# Patient Record
Sex: MALE | Race: WHITE | Employment: FULL TIME | URBAN - METROPOLITAN AREA
[De-identification: names, ages, dates, MRNs, and addresses within clinical notes are randomized per-mention and may not be internally consistent; named-entity substitution may affect disease eponyms.]

---

## 2019-11-10 ENCOUNTER — OFFICE VISIT (OUTPATIENT)
Dept: URGENT CARE | Facility: URGENT CARE | Age: 24
End: 2019-11-10
Payer: COMMERCIAL

## 2019-11-10 ENCOUNTER — ANCILLARY PROCEDURE (OUTPATIENT)
Dept: GENERAL RADIOLOGY | Facility: CLINIC | Age: 24
End: 2019-11-10
Attending: PHYSICIAN ASSISTANT
Payer: COMMERCIAL

## 2019-11-10 VITALS
DIASTOLIC BLOOD PRESSURE: 70 MMHG | SYSTOLIC BLOOD PRESSURE: 104 MMHG | RESPIRATION RATE: 16 BRPM | BODY MASS INDEX: 25.34 KG/M2 | WEIGHT: 177 LBS | HEART RATE: 72 BPM | TEMPERATURE: 98.4 F | HEIGHT: 70 IN

## 2019-11-10 DIAGNOSIS — M25.511 ACUTE PAIN OF RIGHT SHOULDER: ICD-10-CM

## 2019-11-10 DIAGNOSIS — S49.91XA INJURY OF CLAVICLE, RIGHT, INITIAL ENCOUNTER: Primary | ICD-10-CM

## 2019-11-10 DIAGNOSIS — S49.91XA INJURY OF CLAVICLE, RIGHT, INITIAL ENCOUNTER: ICD-10-CM

## 2019-11-10 PROCEDURE — 99203 OFFICE O/P NEW LOW 30 MIN: CPT | Performed by: PHYSICIAN ASSISTANT

## 2019-11-10 PROCEDURE — 73000 X-RAY EXAM OF COLLAR BONE: CPT | Mod: RT

## 2019-11-10 ASSESSMENT — MIFFLIN-ST. JEOR: SCORE: 1803.09

## 2019-11-11 NOTE — PROGRESS NOTES
"SUBJECTIVE:   Pako Rayo is a 24 year old male who presents to the clinic today complaining of shoulder pain on the right side.    Onset of injury or pain was 1 day(s) ago and was still present. The pain is sharp and superior.  Injury history: fall     Exacerbated by: movement of shoulder   Relieved by: ice  Associated symptoms include right side shoulder and clavicle tenderness.    The patient has tried ice to improve symptoms.  He does not have a history of shoulder pain/injury.    PMH  No pert med hx    Current Outpatient Medications:      order for DME, Equipment being ordered: sling, Disp: 1 Device, Rfl: 0  Social History     Tobacco Use     Smoking status: Never Smoker     Smokeless tobacco: Never Used   Substance Use Topics     Alcohol use: Not on file     ALLERGIES  Cats    FAMILY HX  Allergies    ROS:  GENERAL: negative for acute stress  CONSTITUTIONAL:NEGATIVE for fever, chills, change in weight  INTEGUMENTARY/SKIN: NEGATIVE for worrisome rashes, moles or lesions  ENT/MOUTH: NEGATIVE for ear, mouth and throat problems  RESP:NEGATIVE for significant cough or SOB  CV: NEGATIVE for chest pain, palpitations or peripheral edema  GI: NEGATIVE for nausea, abdominal pain, heartburn, or change in bowel habits  MUSCULOSKELETAL: POSITIVE  for right clavicle tenderness, pain  NEURO: NEGATIVE for weakness, dizziness or paresthesias    EXAM:  /70 (Cuff Size: Adult Regular)   Pulse 72   Temp 98.4  F (36.9  C) (Oral)   Resp 16   Ht 1.784 m (5' 10.25\")   Wt 80.3 kg (177 lb)   BMI 25.22 kg/m    General: no acute distress  Neck: no tenderness of neck  Shoulder Exam: normal exam, no swelling, tenderness, instability; ligaments intact, FROM shoulder joint, AC joint tender to palpation  Vasc: negative for cool extremities  Ext: pulses intact.  Neuro: sensation intact to light touch.     ASSESSMENT: Shoulder strain    PLAN:  Ice recommended  Ibuprofen as needed    ICD-10-CM    1. Injury of clavicle, right, initial " encounter S49.91XA XR Clavicle Right     order for DME   2. Acute pain of right shoulder M25.511 XR Clavicle Right     order for DME       See orders in Epic.

## 2020-02-07 ENCOUNTER — OFFICE VISIT (OUTPATIENT)
Dept: URGENT CARE | Facility: URGENT CARE | Age: 25
End: 2020-02-07
Payer: COMMERCIAL

## 2020-02-07 VITALS
DIASTOLIC BLOOD PRESSURE: 70 MMHG | TEMPERATURE: 98.7 F | HEART RATE: 83 BPM | WEIGHT: 177 LBS | RESPIRATION RATE: 16 BRPM | SYSTOLIC BLOOD PRESSURE: 122 MMHG | OXYGEN SATURATION: 100 % | BODY MASS INDEX: 25.22 KG/M2

## 2020-02-07 DIAGNOSIS — J20.9 ACUTE BRONCHITIS, UNSPECIFIED ORGANISM: Primary | ICD-10-CM

## 2020-02-07 PROCEDURE — 99213 OFFICE O/P EST LOW 20 MIN: CPT | Performed by: INTERNAL MEDICINE

## 2020-02-08 NOTE — PATIENT INSTRUCTIONS
Patient Education     Viral Bronchitis (Adult)    You have a viral bronchitis. Bronchitis is inflammation and swelling of the lining of the lungs. This is often caused by an infection. Symptoms include a dry, hacking cough that is worse at night. The cough may bring up yellow-green mucus. You may also feel short of breath or wheeze. Other symptoms may include tiredness, chest discomfort, and chills.  Bronchitis that is caused by a virus is not treated with antibiotics. Instead, medicines may be given to help relieve symptoms. Symptoms can last up to 2 weeks, although the cough may last much longer.  This illness is contagious during the first few days and is spread through the air by coughing and sneezing, or by direct contact (touching the sick person and then touching your own eyes, nose, or mouth).  Most viral illnesses resolve within 10 to 14 days with rest and simple home remedies, although they may sometimes last for several weeks.  Home care    If symptoms are severe, rest at home for the first 2 to 3 days. When you go back to your usual activities, don't let yourself get too tired.    Do not smoke. Also avoid being exposed to secondhand smoke.    You may use over-the-counter medicine to control fever or pain, unless another pain medicine was prescribed. If you have chronic liver or kidney disease or have ever had a stomach ulcer or gastrointestinal bleeding, talk with your healthcare provider before using these medicines. Also talk to your provider if you are taking medicine to prevent blood clots. Aspirin should never be given to anyone younger than 18 years of age who is ill with a viral infection or fever. It may cause severe liver or brain damage.    Your appetite may be poor, so a light diet is fine. Avoid dehydration by drinking 6 to 8 glasses of fluids per day (such as water, soft drinks, sports drinks, juices, tea, or soup). Extra fluids will help loosen secretions in the nose and  lungs.    Over-the-counter cough, cold, and sore-throat medicines will not shorten the length of the illness, but they may help to reduce symptoms. Don't use decongestants if you have high blood pressure.  Follow-up care  Follow up with your healthcare provider, or as advised. If you had an X-ray or ECG (electrocardiogram), a specialist will review it. You will be notified of any new findings that may affect your care.  If you are age 65 or older, or if you have a chronic lung disease or condition that affects your immune system, or you smoke, ask your healthcare provider about getting a pneumococcal vaccine and a yearly flu shot (influenza vaccine).  When to seek medical advice  Call your healthcare provider right away if any of these occur:    Fever of 100.4 F (38 C) or higher, or as directed by your healthcare provider    Coughing up increased amounts of colored sputum    Weakness, drowsiness, headache, facial pain, ear pain, or a stiff neck  Call 911  Call 911 if any of these occur:    Coughing up blood    Worsening weakness, drowsiness, headache, or stiff neck    Trouble breathing, wheezing, or pain with breathing  Date Last Reviewed: 6/1/2018 2000-2019 [employer]. [address] [place] [address], [place] [address]. All rights reserved. This information is not intended as a substitute for professional medical care. Always follow your healthcare professional's instructions.

## 2020-02-08 NOTE — PROGRESS NOTES
SUBJECTIVE:  Pako Rayo, a 24 year old male, presents for evaluation of cough.  Started 3 days ago.  The cough seems to be getting more deep. Cough is occasionally productive.  Feeling fatigued.  Feels some chest tightness.  Denies fevers. Some headache.  Denies sore throat.  No vocal hoarseness. Has been using Dayquil, Nyquil.      OBJECTIVE:  /70   Pulse 83   Temp 98.7  F (37.1  C) (Oral)   Resp 16   Wt 80.3 kg (177 lb)   SpO2 100%   BMI 25.22 kg/m      GENERAL: healthy, alert and no distress  HENT: ear canals and TM's normal and nose and mouth without ulcers or lesions  NECK: no adenopathy, no asymmetry, masses, or scars and thyroid normal to palpation  RESP: forced expiratory wheeze diffusely and bronchospastic cough without focal rales or rhonchi   CV: regular rates and rhythm, normal S1 S2, no S3 or S4 and no murmur, click or rub -  ABDOMEN: soft, nontender, without hepatosplenomegaly or masses and bowel sounds normal    ASSESSMENT/PLAN:    ICD-10-CM    1. Acute bronchitis, unspecified organism J20.9 Otherwise healthy young adult with a mild viral bronchitis syndrome that should be self-limited.  Supportive care.       Jasiel Everett MD

## 2020-03-15 ENCOUNTER — VIRTUAL VISIT (OUTPATIENT)
Dept: FAMILY MEDICINE | Facility: OTHER | Age: 25
End: 2020-03-15

## 2020-03-15 NOTE — PROGRESS NOTES
"Date: 03/15/2020 13:51:49  Clinician: Nicky Sanchez  Clinician NPI: [id number]  Patient: Pako Rayo  Patient : 1995  Patient Address: [facility], [place] [address]  Patient Phone: [phone]  Visit Protocol: URI  Patient Summary:  Pako is a 24 year old ( : 1995 ) male who initiated a Visit for COVID-19 (Coronavirus) evaluation and screening. When asked the question \"Please sign me up to receive news, health information and promotions from [employer].\", Pako responded \"No\".    Pako states his symptoms started gradually 2-3 weeks ago. He is experiencing mild difficulty breathing with activities but can speak normally in full sentences.   Pako denies having ear pain, malaise, fever, headache, rhinitis, enlarged lymph nodes, facial pain or pressure, myalgias, chills, wheezing, sore throat, cough, nasal congestion, and teeth pain. He also denies having recent facial or sinus surgery in the past 60 days, double sickening (worsening symptoms after initial improvement), and taking antibiotic medication for the symptoms.    Pertinent COVID-19 (Coronavirus) information  Pako has not traveled internationally or to the areas where COVID-19 (Coronavirus) is widespread in the last 14 days before the start of his symptoms.   Pako has not had close contact with a suspected or laboratory-confirmed COVID-19 patient within 14 days of symptom onset.   Pako is not a healthcare worker and does not work in a healthcare facility.   Triage Point(s) temporarily suspended for COVID-19 (Coronavirus) screening  Pako reported the following symptoms which were previously protocol referral points. These protocol referral points have temporarily been removed for purposes of COVID-19 (Coronavirus) screening.   Denied all URI symptoms   Pertinent medical history  Pako needs a return to work/school note.   Weight: 175 lbs   Pako does not smoke or use smokeless tobacco.   Additional information as reported by the " patient (free text): My symptoms have been very mild, with shortness of breath being very mild, and chest discomfort that is mild but over the past couple days has been consistent. Feels like my lungs are working harder than normal. But generally I feel healthy other than that.   Weight: 175 lbs    MEDICATIONS: Mucinex DM oral, Flonase Allergy Relief nasal, ALLERGIES: NKDA  Clinician Response:  Dear Pako,  Based on the information provided, you have a viral upper respiratory infection, otherwise known as a cold. Symptoms vary from person to person, but can include sneezing, coughing, a runny nose, sore throat, and headache and range from mild to severe.  Unfortunately, there are no medications that can cure a cold, so treatment is focused on controlling symptoms as much as possible. Most people gradually feel better until symptoms are gone in 1-2 weeks.  Medication information  Because you have a viral infection, antibiotics will not help you get better. Treating a viral infection with antibiotics could actually make you feel worse.  Unless you are allergic to the over-the-counter medication(s) below, I recommend using:       Acetaminophen (Tylenol or store brand) oral tablet. Take 1-2 tablets by mouth every 4-6 hours to help with the discomfort.      Ibuprofen (Advil or store brand) 200 mg oral tablet. Take 1-3 tablets (200-600 mg) by mouth every 8 hours to help with the discomfort. Make sure to take the ibuprofen with food. Do not exceed 2400 mg in 24 hours.    A decongestant such as Sudafed PE or store brand.      Dextromethorphan (Robitussin DM or store brand). This medication is a cough suppressant that works by decreasing the feeling of needing to cough. Please follow the instructions on the package.     Over-the-counter medications do not require a prescription. Ask the pharmacist if you have any questions.  Self care  The following tips will keep you as comfortable as possible while you recover:     Rest     Drink plenty of water and other liquids     When to seek care  Please be seen in a clinic or urgent care if new symptoms develop, or symptoms become worse.  Additional treatment plan   Dear Pako,  Based on the information you have provided, it does not appear you need Coronavirus (COVID-19) testing.   At this time, we recommend testing primarily for those people who have symptoms of cough and fever and have either traveled to a known area of infection or have been exposed to someone with laboratory confirmed Coronavirus by close contact.   Coronavirus - General Information:   The coronavirus infection starts within 14 days of an exposure.  Symptoms are those of a respiratory infection (such as fever, cough).   If you have not had symptoms by day 15, you should be considered uninfected by coronavirus.   Coronavirus - Symptoms:    The coronavirus can cause a respiratory illness, such as bronchitis or pneumonia.  The most common symptoms are: cough, fever, and shortness of breath.   Other symptoms are: body aches, chills, diarrhea, fatigue, headache, runny nose, and sore throat   Coronavirus - Exposure Risk Factors:   Exposure to a person who has been diagnosed with coronavirus.  Travel from an area with recent local transmission of coronavirus.  The CDC (www.cdc.gov) has the most up-to-date list of where the coronavirus outbreak is occurring.   Coronavirus - Spreading:    The virus likely spreads through respiratory droplets produced when a person coughs or sneezes. These respiratory droplets can travel approximately 6 feet and can remain on surfaces. Common disinfectants will kill the virus.  The CDC currently does not recommend healthy people wear masks.   Coronavirus - Protect Yourself:    Avoid close contact with people known to have this new coronavirus infection.  Wash hands often with soap and water or alcohol-based hand .  Avoid touching the eyes, nose or mouth.   Thank you for limiting contact  with others, wearing a simple mask to cover your cough, practice good hand hygiene habits and accessing our virtual services where possible to limit the spread of this virus.  For more information about COVID19 and options for caring for yourself at home, please visit the CDC website at https://www.cdc.gov/coronavirus/2019-ncov/about/steps-when-sick.html   For more options for care at [facility], please visit our website at [email or web address]     Diagnosis: Cough  Diagnosis ICD: R05

## 2020-07-16 ENCOUNTER — NURSE TRIAGE (OUTPATIENT)
Dept: NURSING | Facility: CLINIC | Age: 25
End: 2020-07-16

## 2020-07-16 DIAGNOSIS — Z11.59 SCREENING FOR VIRAL DISEASE: ICD-10-CM

## 2020-07-16 DIAGNOSIS — Z11.59 SCREENING FOR VIRAL DISEASE: Primary | ICD-10-CM

## 2020-07-16 PROCEDURE — 36415 COLL VENOUS BLD VENIPUNCTURE: CPT | Performed by: EMERGENCY MEDICINE

## 2020-07-16 PROCEDURE — 86769 SARS-COV-2 COVID-19 ANTIBODY: CPT | Mod: 90 | Performed by: EMERGENCY MEDICINE

## 2020-07-16 PROCEDURE — 99000 SPECIMEN HANDLING OFFICE-LAB: CPT | Performed by: EMERGENCY MEDICINE

## 2020-07-16 NOTE — LETTER
July 20, 2020        Pako Rayo  [address] [facility] [address]      COVID-19 Antibody Screen   Date Value Ref Range Status   07/16/2020 Negative  Final     Comment:     No COVID-19 antibodies detected.  Patients within 10 days of symptom onset for   COVID-19 may not produce sufficient levels of detectable antibodies.    Immunocompromised COVID-19 patients may take longer to develop antibodies.       COVID-19 Antibody, IgG Titer   Date Value Ref Range Status   07/16/2020 Not Applicable  Final     Comment:     Qualitative screen for total antibodies to COVID-19 (SARS-CoV-2) with   semi-quantitative measurement of IgG COVID-19 antibodies by endpoint titer.    COVID-19 antibodies may be elevated due to a past or current infection.  Negative results do not rule out COVID-19 infection.  Results from antibody   testing should not be used as the sole basis to diagnose or exclude SARS-CoV-2   infection or to inform infection status.  COVID-19 PCR test should be ordered   if current infection is suspected.  False positive results may occur in rare   cases due to cross-reacting antibodies.  This test was developed and its performance characteristics determined by the   [facility] ([facility]),   which is regulated under CLIA as qualified to perform high-complexity testing.    This test has not been reviewed by the FDA.  Testing performed by [facility], [facility], [address], [address], [place] [address]         No results found for: COVAB      You have tested NEGATIVE for COVID-19 antibodies. This suggests you have not had or been exposed to COVID-19. But it does not mean that for sure.     The test finds antibodies in most people 10 days after they get sick. For some people, it takes longer than 10 days for antibodies to show up. Others may never show antibodies against COVID-19, especially if they  have weak immune systems.    If you have COVID-19 symptoms now, please stay home and away from others.     What is antibody testing?    This is a kind of blood test. We take a small sample of your blood, and then test it for something called  antibodies.      Your body makes antibodies to fight infection. If your blood has antibodies for a certain germ, it means you ve been infected with that germ in the past.     Sometimes, antibodies stay in your body for years after you ve had the infection. They can be there even if the germ didn t make you sick. They are a sign that your body fought off the infection.    Will this test find antibodies in everyone who s had COVID-19?    No. The test finds antibodies in most people 10 days after they get sick. For some people, it takes longer than 10 days for antibodies to show up. Others may never show antibodies against COVID-19, especially if they have weak immune systems.    What does it mean if the test finds COVID-19 antibodies?    If we find these antibodies, it suggests:     This person has had the virus.     Their body s immune system fought the virus.     We don t know if this will help protect someone from getting COVID-19 again. Scientists are still learning about this.    What are the signs of COVID-19?    Signs of COVID-19 can appear from 2 to 14 days (up to 2 weeks) after you re infected. Some people have no symptoms or only mild symptoms. Others get very sick. The most common symptoms are:          Cough    Shortness of breath or trouble breathing  Or at least 2 of these symptoms:    Fever    Chills    Repeated shaking with chills    Muscle pain    Headache    Sore throat    Losing your sense of taste or smell    You may have other symptoms. Please contact your doctor or clinic for any symptoms that worry you.    Where can I get more information?     To learn the [place] s guidelines for staying home, please visit the [facility] website at  [email or web address]    To learn more about COVID-19 and how to care for yourself at home, please visit the CDC website at https://www.cdc.gov/coronavirus/2019-ncov/about/steps-when-sick.html    For more options for care at [facility], please visit our website at [email or web address]    [place] [facility] ([facility]) COVID-19 Hotline:  [phone]

## 2020-07-16 NOTE — TELEPHONE ENCOUNTER
"Patient is calling requesting COVID serologic antibody testing.  NOTE: Serologic testing is a blood test for 'antibodies' which are made at 10-14 days after you have had symptoms of COVID or were exposed and had an asymptomatic infection.  This does NOT test you for 'active' infection or tell you if you are contagious.    Are you a healthcare worker?  No  Do you currently have a cough, fever, body aches, shortness of breath, or difficulty breathing?  No  Did you previously have cough, fever, body aches, shortness of breath, or difficulty breathing that have now resolved? Has had previous covid symptoms.   Symptoms began 150 days ago.  Symptoms started > 14 days ago. Lab order placed per SARS-CoV-2 Serology test Standing Order using indication \"Previously symptomatic >14d since onset, currently asymptomatic\" and diagnosis code \"Screening for viral disease\" (Z11.59)      The patient was informed: \"Testing is limited each day and it may take time for testing to be available to everyone who has called. You will receive a call within 48-72 hours to schedule the serology testing. Please confirm the best number to reach you is [phone]. If you have any questions about scheduling, call [phone].\"   Lesvia Mcneal RN  [facility]    "

## 2020-07-17 LAB
COVID-19 SPIKE RBD ABY TITER: NORMAL
COVID-19 SPIKE RBD ABY: NEGATIVE

## 2020-12-06 ENCOUNTER — HEALTH MAINTENANCE LETTER (OUTPATIENT)
Age: 25
End: 2020-12-06

## 2021-09-25 ENCOUNTER — HEALTH MAINTENANCE LETTER (OUTPATIENT)
Age: 26
End: 2021-09-25

## 2022-01-15 ENCOUNTER — HEALTH MAINTENANCE LETTER (OUTPATIENT)
Age: 27
End: 2022-01-15

## 2023-04-22 ENCOUNTER — HEALTH MAINTENANCE LETTER (OUTPATIENT)
Age: 28
End: 2023-04-22